# Patient Record
Sex: MALE | Race: WHITE | NOT HISPANIC OR LATINO | Employment: FULL TIME | URBAN - METROPOLITAN AREA
[De-identification: names, ages, dates, MRNs, and addresses within clinical notes are randomized per-mention and may not be internally consistent; named-entity substitution may affect disease eponyms.]

---

## 2018-12-17 ENCOUNTER — OFFICE VISIT (OUTPATIENT)
Dept: FAMILY MEDICINE | Facility: CLINIC | Age: 38
End: 2018-12-17
Payer: COMMERCIAL

## 2018-12-17 VITALS
HEART RATE: 74 BPM | DIASTOLIC BLOOD PRESSURE: 80 MMHG | RESPIRATION RATE: 16 BRPM | SYSTOLIC BLOOD PRESSURE: 119 MMHG | OXYGEN SATURATION: 100 % | BODY MASS INDEX: 21.22 KG/M2 | HEIGHT: 69 IN | WEIGHT: 143.3 LBS | TEMPERATURE: 98.3 F

## 2018-12-17 DIAGNOSIS — Z00.00 ENCOUNTER FOR PREVENTIVE HEALTH EXAMINATION: Primary | ICD-10-CM

## 2018-12-17 DIAGNOSIS — Z23 NEED FOR INFLUENZA VACCINATION: ICD-10-CM

## 2018-12-17 DIAGNOSIS — G43.001 MIGRAINE WITHOUT AURA AND WITH STATUS MIGRAINOSUS, NOT INTRACTABLE: ICD-10-CM

## 2018-12-17 DIAGNOSIS — Z12.5 SCREENING FOR PROSTATE CANCER: ICD-10-CM

## 2018-12-17 DIAGNOSIS — H61.23 BILATERAL IMPACTED CERUMEN: ICD-10-CM

## 2018-12-17 DIAGNOSIS — Z13.220 SCREENING FOR HYPERLIPIDEMIA: ICD-10-CM

## 2018-12-17 RX ORDER — GABAPENTIN 300 MG/1
600 CAPSULE ORAL 2 TIMES DAILY
Qty: 120 CAPSULE | Refills: 2 | Status: SHIPPED | OUTPATIENT
Start: 2018-12-17 | End: 2019-05-14

## 2018-12-17 RX ORDER — GABAPENTIN 300 MG/1
600 CAPSULE ORAL 3 TIMES DAILY
COMMUNITY
End: 2018-12-17

## 2018-12-17 SDOH — HEALTH STABILITY: MENTAL HEALTH: HOW MANY STANDARD DRINKS CONTAINING ALCOHOL DO YOU HAVE ON A TYPICAL DAY?: 1 OR 2

## 2018-12-17 SDOH — HEALTH STABILITY: MENTAL HEALTH: HOW OFTEN DO YOU HAVE 6 OR MORE DRINKS ON ONE OCCASION?: NEVER

## 2018-12-17 SDOH — HEALTH STABILITY: MENTAL HEALTH: HOW OFTEN DO YOU HAVE A DRINK CONTAINING ALCOHOL?: MONTHLY OR LESS

## 2018-12-17 ASSESSMENT — PAIN SCALES - GENERAL: PAINLEVEL: NO PAIN (0)

## 2018-12-17 ASSESSMENT — MIFFLIN-ST. JEOR: SCORE: 1550.38

## 2018-12-17 NOTE — PROGRESS NOTES
SUBJECTIVE:   Nato Vaz is an 38 year old year old man who presents for preventive health visit.     Healthy Habits:    Amount of exercise or daily activities, outside of work: Yoga twice weekly for 1 hour each    Problems taking medications regularly No    Medication side effects: No    Have you had an eye exam in the past two years? no    Do you see a dentist twice per year? yes    Do you have sleep apnea, excessive snoring or daytime drowsiness?no    Water: adequate    Caffeine: drinks 2 cups/coffee daily, 1-2 cups of tea    Sleep: estimates about 7 hours, does wake a few times each night    Migraines with aura: Nato reports having a history of migraines which started in the 3rd grade. Currently controlled with gabapentin, taking 300mg q AM and 600mg in the PM. For breakthrough headaches can use ibuprofen 600mg as needed. Migraines have an aura of central blind spot and losses the ability to read for roughly 20 minutes. Will present with sudden word loss while reading and after having to sound out which will trigger his recognition of impending migraine. Migraines now occur rarely with current medication regimen which he has been on for the past 3 years.     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     Abuse: Current or Past(Physical, Sexual or Emotional)- No  Do you feel safe in your environment - Yes    Past Medical History:   Diagnosis Date     History of migraine with aura 1985     Past Surgical History:   Procedure Laterality Date     NO HISTORY OF SURGERY       Social History     Socioeconomic History     Marital status:      Spouse name: Not on file     Number of children: Not on file     Years of education: Not on file     Highest education level: Not on file   Social Needs     Financial resource strain: Not on file     Food insecurity - worry: Not on file     Food insecurity - inability: Not on file     Transportation needs -  medical: Not on file     Transportation needs - non-medical: Not on file   Occupational History     Occupation: Neurologist   Tobacco Use     Smoking status: Never Smoker     Smokeless tobacco: Never Used   Substance and Sexual Activity     Alcohol use: Yes     Alcohol/week: 0.6 oz     Types: 1 Cans of beer per week     Frequency: Monthly or less     Drinks per session: 1 or 2     Binge frequency: Never     Drug use: No     Sexual activity: Yes     Partners: Female   Other Topics Concern     Not on file   Social History Narrative     Not on file     Family History   Problem Relation Age of Onset     Prostate Cancer Father         diagnosed later but fairly agressive     Abdominal Aortic Aneurysm Maternal Grandfather      Abdominal Aortic Aneurysm Other         Great uncles     Abdominal Aortic Aneurysm Maternal Uncle      Prostate Cancer Paternal Uncle         diagnosed later but fairly agressive     Prostate Cancer Paternal Great-Grandmother         diagnosed later but fairly agressive       If you drink alcohol do you typically have >3 drinks per day or >10 drinks per week? No                     Reviewed orders with patient.  Reviewed health maintenance and updated orders accordingly -       Current Outpatient Medications   Medication Sig Dispense Refill     gabapentin (NEURONTIN) 300 MG capsule Take 600 mg by mouth 3 times daily          Not on File     Injectable Influenza Immunization Documentation    1.  Has the patient received the information for the injectable influenza vaccine? YES     2. Is the patient 6 months of age or older? YES     3. Does the patient have any of the following contraindications?         Severe allergy to eggs? No     Severe allergic reaction to previous influenza vaccines? No   Severe allergy to latex? No       History of Guillain-Widen syndrome? No     Currently have a temperature greater than 100.4F? No        4.  Severely egg allergic patients should have flu vaccine eligibility  "assessed by an MD, RN, or pharmacist, and those who received flu vaccine should be observed for 15 min by an MD, RN, Pharmacist, Medical Technician, or member of clinic staff.\": NO    5. Latex-allergic patients should be given latex-free influenza vaccine No. Please reference the Vaccine latex table to determine if your clinic s product is latex-containing.       Vaccination given by MARTIN Bruce EMT    ROS:  Constitutional: no fevers, chills, night sweats or unintentional weight change   Eyes: no vision change, diplopia or red eyes   Ears, Nose, Mouth, Throat: no tinnitus or hearing change, no epistaxis or nasal discharge, no oral lesions, throat clear   Cardiovascular: no chest pain, palpitations, or pain with walking, no orthopnea or PND   Respiratory: no dyspnea, cough, shortness of breath or wheezing   GI: no nausea, vomiting, diarrhea or constipation, no abdominal pain   : no change in urine, no dysuria or hematuria, no sexual dysfunction   Musculoskeletal: no joint or muscle pain or swelling   Integumentary: no concerning lesions or moles   Neuro: no loss of strength or sensation, no numbness or tingling, no tremor, no dizziness, no headache, no gait disturbance   Endo: no polyuria or polydipsia, no temperature intolerance   Heme/Lymph: no concerning bumps, no bleeding problems   Allergy: no environmental allergies   Psych: no depression or anxiety, no sleep problems    OBJECTIVE:   /80 (BP Location: Right arm, Patient Position: Sitting, Cuff Size: Adult Regular)   Pulse 74   Temp 98.3  F (36.8  C) (Oral)   Resp 16   Ht 1.753 m (5' 9\")   Wt 65 kg (143 lb 4.8 oz)   SpO2 100%   BMI 21.16 kg/m    EXAM:  GENERAL: healthy, alert and no distress  EYES: Eyes grossly normal to inspection, PERRL and conjunctivae and sclerae normal  HENT: normal cephalic/atraumatic, right ear: occluded with wax, left ear: occluded with wax, nose and mouth without ulcers or lesions, oropharynx clear and oral mucous membranes " "moist. *After successful irrigation bilaterally was able to visualize TMs. WNL and intact.   NECK: no adenopathy, no asymmetry, masses, or scars and thyroid normal to palpation  RESP: lungs clear to auscultation - no rales, rhonchi or wheezes  CV: regular rate and rhythm, normal S1 S2, no S3 or S4, no murmur, click or rub, no peripheral edema and peripheral pulses strong  ABDOMEN: soft, nontender, no hepatosplenomegaly, no masses and bowel sounds normal  MS: no gross musculoskeletal defects noted, no edema  SKIN: no suspicious lesions or rashes  NEURO: Normal strength and tone, mentation intact and speech normal  PSYCH: mentation appears normal, affect normal/bright    ASSESSMENT/PLAN:   1. Migraine without aura and with status migrainosus, not intractable  Refill medication to preferred pharmacy. Nato to follow up in 3 months for ongoing monitoring with medication efficacy and compliance, worsening of symptoms or changes in headaches/migraines.   - gabapentin (NEURONTIN) 300 MG capsule; Take 2 capsules (600 mg) by mouth 2 times daily  Dispense: 120 capsule; Refill: 2    2. Encounter for preventive health examination  Preventive measures today: influenza vaccine, lipids. In stable, monogamous relationship and does not need STI screening. Non-smoker.     3. Screening for hyperlipidemia  Nato non-fasting, lipids to be drawn at future date.   - Lipid panel reflex to direct LDL Fasting; Future    4. Screening for prostate cancer  Discussed risk versus benefits and accuracy of PSA; with family history of aggressive prostate cancer would like to proceed with screening as all males diagnoses were proceeded \"by a spike in their PSA\". Would like to proceed with monitoring PSA for potential changes.   - Prostate spec antigen screen; Future    5. Need for influenza vaccination  - Influenza vaccine  - ADMIN VACCINE, INITIAL    6. Bilateral impacted cerumen  Successful irrigation of bilateral cerumen impactions. Patient " "tolerated well, reported development of nystagmus during procedure and slight nausea afterwards that \"is not unexpected\".       COUNSELING:   Reports that he has never smoked. He has never used smokeless tobacco.    Counseling Resources:  ATP IV Guidelines  Pooled Cohorts Equation Calculator  ICSI Preventive Guidelines  Dietary Guidelines for Americans, 2010  USDA's MyPlate  ASA Prophylaxis  Lung CA Screening    Options for treatment and follow-up care were reviewed with the patient. Nato Vaz   engaged in the decision making process and verbalized understanding of the options discussed and agreed with the final plan.    Aye Dunn, CONNIE CNP on 12/17/2018 at 2:24 PM  [facility]  "

## 2018-12-17 NOTE — NURSING NOTE
Chief Complaint   Patient presents with     Establish Care     Pt is here to establish care with a PCC.         Gil Amaya, EMT on 12/17/2018 at 2:01 PM

## 2018-12-17 NOTE — PATIENT INSTRUCTIONS
- Follow up in 3 months; can be either at [facility] or at NP clinic at [address] [place] [address] in [facility].   - Labs drawn at [facility] location while fasting at your convenience.   - Please sign up for My Chart    [facility] location: [phone]; [address] [place] [id number] *I am at this location every other Monday    [facility] location: [phone] [address] [address] *I am at this location most days of the week

## 2018-12-17 NOTE — PROGRESS NOTES
Patient tolerated ear wash well.  Large amount of impacted cerumen removed from bilateral ear/ears.  Tympanic membrane visible.    Gil Amaya, EMT on 12/17/2018 at 3:17 PM

## 2019-05-14 DIAGNOSIS — G43.001 MIGRAINE WITHOUT AURA AND WITH STATUS MIGRAINOSUS, NOT INTRACTABLE: ICD-10-CM

## 2019-05-14 RX ORDER — GABAPENTIN 300 MG/1
600 CAPSULE ORAL 2 TIMES DAILY
Qty: 120 CAPSULE | Refills: 2 | Status: SHIPPED | OUTPATIENT
Start: 2019-05-14 | End: 2019-08-18

## 2019-08-27 ENCOUNTER — TELEPHONE (OUTPATIENT)
Dept: FAMILY MEDICINE | Facility: CLINIC | Age: 39
End: 2019-08-27

## 2019-08-27 NOTE — TELEPHONE ENCOUNTER
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 No answer, left message asking patient to follow up so could apologize directly.   Aye Dunn, APRN CNP  August 27, 2019

## 2019-11-17 DIAGNOSIS — G43.001 MIGRAINE WITHOUT AURA AND WITH STATUS MIGRAINOSUS, NOT INTRACTABLE: ICD-10-CM

## 2019-11-19 RX ORDER — GABAPENTIN 300 MG/1
CAPSULE ORAL
Qty: 120 CAPSULE | Refills: 11 | Status: SHIPPED | OUTPATIENT
Start: 2019-11-19 | End: 2020-11-05

## 2019-11-19 NOTE — TELEPHONE ENCOUNTER
GABAPENTIN 300 MG CAPSULE   Take 2 capsules (600 mg) by mouth 2 times daily   Last Written Prescription Date:  8/27/19  Last Fill Quantity: 120,   # refills: 2  Last Office Visit : 12/17/2018  Future Office visit:  none    Routing refill request to provider for review/approval because:  Drug not on  [facility] or [facility] refill protocol or controlled substance.

## 2020-03-11 ENCOUNTER — HEALTH MAINTENANCE LETTER (OUTPATIENT)
Age: 40
End: 2020-03-11

## 2020-10-31 ENCOUNTER — VIRTUAL VISIT (OUTPATIENT)
Dept: FAMILY MEDICINE | Facility: OTHER | Age: 40
End: 2020-10-31

## 2020-11-04 ENCOUNTER — NURSE TRIAGE (OUTPATIENT)
Dept: NURSING | Facility: CLINIC | Age: 40
End: 2020-11-04

## 2020-11-04 NOTE — TELEPHONE ENCOUNTER
[facility]: Nurse Triage Note  SITUATION/BACKGROUND                                                      Nato Vaz is a 40 year old male who calls with rash.    Allergies: No Known Allergies    ASSESSMENT      40 year old male calls with a rash  He states the rash started a week ago on his face and groin. He states it has spread to his extremities   It is very itchy   He is taking benadryl   He denies breathing problems,chest tightness,swelling throat or tongue or changes in mental status.  He states the rash has improved on his face.      He is scheduled for an appointment tomorrow morning.  He stattes he is aware when to go to the ED.  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  He will continue to take benadryl...      RECOMMENDATION/PLAN                                                      RECOMMENDED DISPOSITION:  appt tomorrow am..  Will comply with recommendation: Yes    If further questions/concerns or if symptoms do not improve, worsen or new symptoms develop, call your PCP or [phone] to talk with the Resident on call, as soon as possible.    Guideline used: rash adult.  Telephone Triage Protocols for Nurses, Fifth Edition, Inessa Bautista, RN, RN

## 2020-11-05 ENCOUNTER — OFFICE VISIT (OUTPATIENT)
Dept: LAB | Facility: CLINIC | Age: 40
End: 2020-11-05
Attending: NURSE PRACTITIONER
Payer: COMMERCIAL

## 2020-11-05 ENCOUNTER — VIRTUAL VISIT (OUTPATIENT)
Dept: FAMILY MEDICINE | Facility: CLINIC | Age: 40
End: 2020-11-05
Payer: COMMERCIAL

## 2020-11-05 DIAGNOSIS — G43.001 MIGRAINE WITHOUT AURA AND WITH STATUS MIGRAINOSUS, NOT INTRACTABLE: ICD-10-CM

## 2020-11-05 DIAGNOSIS — R21 RASH: ICD-10-CM

## 2020-11-05 DIAGNOSIS — R21 RASH: Primary | ICD-10-CM

## 2020-11-05 PROCEDURE — 99214 OFFICE O/P EST MOD 30 MIN: CPT | Mod: 95 | Performed by: NURSE PRACTITIONER

## 2020-11-05 PROCEDURE — U0003 INFECTIOUS AGENT DETECTION BY NUCLEIC ACID (DNA OR RNA); SEVERE ACUTE RESPIRATORY SYNDROME CORONAVIRUS 2 (SARS-COV-2) (CORONAVIRUS DISEASE [COVID-19]), AMPLIFIED PROBE TECHNIQUE, MAKING USE OF HIGH THROUGHPUT TECHNOLOGIES AS DESCRIBED BY CMS-2020-01-R: HCPCS | Mod: 90 | Performed by: PATHOLOGY

## 2020-11-05 PROCEDURE — 99000 SPECIMEN HANDLING OFFICE-LAB: CPT | Performed by: PATHOLOGY

## 2020-11-05 RX ORDER — METHYLPREDNISOLONE 4 MG
TABLET, DOSE PACK ORAL
Qty: 21 TABLET | Refills: 0 | Status: SHIPPED | OUTPATIENT
Start: 2020-11-05 | End: 2021-05-06

## 2020-11-05 RX ORDER — GABAPENTIN 300 MG/1
600 CAPSULE ORAL 2 TIMES DAILY
Qty: 360 CAPSULE | Refills: 3 | Status: SHIPPED | OUTPATIENT
Start: 2020-11-05

## 2020-11-05 NOTE — PROGRESS NOTES
"Nato Vaz is a 40 year old male who is being evaluated via a billable video visit.      The patient has been notified of following:     \"This video visit will be conducted via a call between you and your physician/provider. We have found that certain health care needs can be provided without the need for an in-person physical exam.  This service lets us provide the care you need with a video conversation.  If a prescription is necessary we can send it directly to your pharmacy.  If lab work is needed we can place an order for that and you can then stop by our lab to have the test done at a later time.    Video visits are billed at different rates depending on your insurance coverage.  Please reach out to your insurance provider with any questions.    If during the course of the call the physician/provider feels a video visit is not appropriate, you will not be charged for this service.\"    Patient has given verbal consent for Video visit? Yes  How would you like to obtain your AVS? MyChart  If you are dropped from the video visit, the video invite should be resent to: Other e-mail: [email or web address]  Will anyone else be joining your video visit? No    Subjective     Nato Vaz is a 40 year old male who presents today via video visit for the following health issues:    HPI     Rash: started about one week to 10 days ago. The rash started on his hands, on the webs of his fingers, moved up his body and now encompasses his trunk (abdomen), arms, face. 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 He does not recall any encounters with any Covid patients. He is under a significant amount of stress. He denies changing of laundry soaps, clothing, diet or medications. He does recall this happening once prior in medical school, however he does not recall that the rash was as significant as it currently is. Nato was prescribed a Medrol dose pack to calm the rash down at that time and did not ever " end up taking it. He has been taking Benadryl and Tylenol for this rash which has been somewhat effective. He presents for discussion and evaluation.   Migraine: Nato takes Gabapentin up to twice daily for migraine prevention. He feels that this therapy is quite effective for migraine prevention/managment. He would like a refill of his Gabapentin.     Video Start Time:1040      Review of Systems   Constitutional, HEENT, cardiovascular, pulmonary, gi and gu systems are negative, except as otherwise noted.   *Rash  Objective       Vitals:  No vitals were obtained today due to virtual visit.    Physical Exam     GENERAL: Healthy, alert and no distress  EYES: Eyes grossly normal to inspection.  No discharge or erythema, or obvious scleral/conjunctival abnormalities.  RESP: No audible wheeze, cough, or visible cyanosis.  No visible retractions or increased work of breathing.    SKIN: Rash noted on hands, arms and abdomen, under eyes. Rash appears red, macular with dense areas being in areas identified.   NEURO: Cranial nerves grossly intact.  Mentation and speech appropriate for age.  PSYCH: Mentation appears normal, affect normal/bright, judgement and insight intact, normal speech and appearance well-groomed.    No diagnostics ordered today.     Assessment & Plan     Rash    - methylPREDNISolone (MEDROL DOSEPAK) 4 MG tablet therapy pack  Dispense: 21 tablet; Refill: 0  - Asymptomatic COVID-19 Virus (Coronavirus) by PCR    Migraine without aura and with status migrainosus, not intractable    - gabapentin (NEURONTIN) 300 MG capsule  Dispense: 360 capsule; Refill: 3    Return if symptoms worsen or fail to improve.    Chasity Wild NP  [facility] [place]    Video-Visit Details    Type of service:  Video Visit    Video End Time:1059    Originating Location (pt. Location): Home    Distant Location (provider location): Provider's home     Platform used for Video Visit: AmWell    First,  Nato will take a Medrol dose be as needed for this unrelenting rash. Next, he will continue to observe this rash and update the clinic with any worsening or persisting symptoms. His Gabapentin is filled today. Finally, he will return as needed. He verbalizes understanding of the plan.   CONNIE Vaughn, CNP

## 2020-11-05 NOTE — NURSING NOTE
Chief Complaint   Patient presents with     Derm Problem     Discuss rash on several parts of body.     SIMONE Rice 9:43 AM  11/5/2020

## 2020-11-06 LAB
LABORATORY COMMENT REPORT: NORMAL
SARS-COV-2 RNA SPEC QL NAA+PROBE: NEGATIVE
SARS-COV-2 RNA SPEC QL NAA+PROBE: NORMAL
SPECIMEN SOURCE: NORMAL
SPECIMEN SOURCE: NORMAL

## 2020-11-06 NOTE — PATIENT INSTRUCTIONS

## 2020-12-16 NOTE — TELEPHONE ENCOUNTER
REFERRAL INFORMATION:    Referring Provider:  Chasity Wild NP     Ref[facility] Clinic[facility]     Reason for Visit/Diagnosis: Possible achalasia- mild pain with eating      FUTURE VISIT INFORMATION:    Appointment Date: 1/7/2021    Appointment Time: 2 PM      NOTES STATUS DETAILS   OFFICE NOTE from Referring Provider Internal 11/5/2020 Office visit with Chasity Wild NP    OFFICE NOTE from Other Specialist In process    HOSPITAL DISCHARGE SUMMARY/  ED VISITS N/A    OPERATIVE REPORT N/A    MEDICATION LIST Internal         ENDOSCOPY  In process    COLONOSCOPY In process    ERCP N/A    EUS N/A    STOOL TESTING N/A    PERTINENT LABS In process    PATHOLOGY REPORTS (RELATED) In process    IMAGING (CT, MRI, EGD, MRCP, Small Bowel Follow Through/SBT, MR/CT Enterography) In process      1/29/2021 3:55pm Call and spoke with Pt about recs with [facility]. NANCY was emailed to Pt at email address: [email or web address] - [email or web address]      2/8/2021 5:02pm Called and LVM for Pt. -Woody    2/10/2021 2:51pm Called and LVM for Pt. CSS will message provider about missing recs from [facility]. -Woody

## 2021-01-03 ENCOUNTER — HEALTH MAINTENANCE LETTER (OUTPATIENT)
Age: 41
End: 2021-01-03

## 2021-01-05 ENCOUNTER — MYC MEDICAL ADVICE (OUTPATIENT)
Dept: FAMILY MEDICINE | Facility: CLINIC | Age: 41
End: 2021-01-05

## 2021-01-05 DIAGNOSIS — K21.00 GASTROESOPHAGEAL REFLUX DISEASE WITH ESOPHAGITIS WITHOUT HEMORRHAGE: Primary | ICD-10-CM

## 2021-01-07 ENCOUNTER — PRE VISIT (OUTPATIENT)
Dept: GASTROENTEROLOGY | Facility: CLINIC | Age: 41
End: 2021-01-07

## 2021-01-12 ENCOUNTER — TELEPHONE (OUTPATIENT)
Dept: GASTROENTEROLOGY | Facility: CLINIC | Age: 41
End: 2021-01-12

## 2021-01-12 NOTE — TELEPHONE ENCOUNTER
Spoke with Nato in regards to scheduling his EGD. Patient stated that he would like to hold off on scheduling until after his consult with the GI provider. Patient will call back to schedule after his consult.     Procedure: Upper Endoscopy    Upper Endoscopy Type: EGD    Upper Endoscopy Sedation: Conscious/Moderate    Upper Endoscopy Reason for Procedure: GERD    Preferred Location: [facility]    Scheduling Instructions: If you have not heard from the scheduling office within 2 business days, please call [phone].      Dx: Gastroesophageal reflux disease with esophagitis without hemorrhage [K21.00]

## 2021-02-11 ENCOUNTER — VIRTUAL VISIT (OUTPATIENT)
Dept: GASTROENTEROLOGY | Facility: CLINIC | Age: 41
End: 2021-02-11
Payer: COMMERCIAL

## 2021-02-11 VITALS — WEIGHT: 140 LBS | HEIGHT: 69 IN | BODY MASS INDEX: 20.73 KG/M2

## 2021-02-11 DIAGNOSIS — K21.9 GASTROESOPHAGEAL REFLUX DISEASE WITHOUT ESOPHAGITIS: Primary | ICD-10-CM

## 2021-02-11 DIAGNOSIS — R13.10 DYSPHAGIA, UNSPECIFIED TYPE: ICD-10-CM

## 2021-02-11 DIAGNOSIS — R07.89 ATYPICAL CHEST PAIN: ICD-10-CM

## 2021-02-11 PROCEDURE — 99205 OFFICE O/P NEW HI 60 MIN: CPT | Mod: 95 | Performed by: INTERNAL MEDICINE

## 2021-02-11 ASSESSMENT — MIFFLIN-ST. JEOR: SCORE: 1525.42

## 2021-02-11 NOTE — NURSING NOTE
"Chief Complaint   Patient presents with     New Patient       Vitals:    02/11/21 1435   Weight: 63.5 kg (140 lb)   Height: 1.753 m (5' 9\")       Body mass index is 20.67 kg/m .      WOUND EVALUATION:                        "

## 2021-02-11 NOTE — PROGRESS NOTES
"GI CLINIC VISIT    CC/REFERRING MD:  Chasity Wild  REASON FOR CONSULTATION:   Chasity Wild for: \"Possible Achalasia or Dysphagia- mild pain with eating. External Records [facility] upper endoscopy 2011.\"    ASSESSMENT/PLAN:  1. Dysphagia  Reports 6-month history of near-constant low-grade substernal aching worsened by intermittnet sensation of solid food getting stuck. While described as distinct from historic GERD symptoms, this could reasonably represent reflux esophagitis. Given his allergies and age demographic, eosinophilic esophagitis is also possible. Visualization with EGD would help identify any evidence of esophagitis or associated complication such as stricture. Unlikely to represent achalasia, as he has no problems with liquids. Also of additional concern is his unintentional weight loss, and cancer etiology cannot be ruled out.  Plan:  - schedule for EGD with biopsy of mid and distal esophagus, stomach and small intestine  - consider PPI +/- H2 blocker pending above results    2. Chronic loose stools, with intermittent fecal urgency  Reported baseline stool pattern is 5-9 loose stools throughout the day, associated with fecal urgency but never incontinence. He is overall not concerned about this. Chronic diarrhea has a broad differential, but could consider general workup for IBD vs microscopic colitis vs SIBO vs functional etiologies. We discussed option of simultaneous EGD with colonoscopy, but patient prefers pursuing EGD first prior to further lower workup. EGD biopsies of small intestine could also be helpful for identifying other etiology, such as Celiac disease.    RTC in 3 months, following completion of endoscopy.    Thank you for this consultation.  It was a pleasure to participate in the care of this patient; please contact us with any further questions.  A total of 54 minutes, face to face, was spent with this patient, >50% of which was counseling regarding the above delineated " "issues.    Patient was seen and discussed with Dr. Leach.    Andrade Guy MD  Internal Medicine, PGY2  [facility]    ATTENDING ATTESTATION:    Virtual visit for patient conducted via three way video conference with myself, patient and Dr. Guy.   I reviewed the history and abbreviated physical exam and discussed the management with Dr. Guy on 2/11/21. I reviewed the note and there are no changes to the past medical, family or social history.  A complete 10 point review of systems was obtained. Please see the HPI for pertinent positives and negatives. All other systems were reviewed and were found to be negative. I agree with the documented findings and plan of care as outlined.    40 YO M with history of GERD now with symptoms of dysphagia. Need to evaluate with EGD. Further recommendations to follow EGD. GERD lifestyle modifications recommended as well.    Luis Miguel Leach MD  [facility]  Division of Gastroenterology, Hepatology and Nutrition      [facility]  Patient is a 39yo male with PMH including migraine headaches who is evaluated today via video virtual visit for complaint of dysphagia.     He reports a 6-month history of mild aching pain that he generally localizes to the right side of his sternum, with intermittent radiation to his right armpit and back. This is different from his historical and longstanding GERD symptoms. This pain is not always associated with eating, but rather is an everyday low-grade discomfort that is near-constantly present. Some positional exacerbation when laying down on his back or on his side.     This upper abdominal-substernal pain is intermittently exacerbated by swallowing scratchy or harder food such as pretzels. He reports feeling \"small particles\" of solid food getting stuck. When this happens, it quickly self-resolves on the order of seconds, sometimes before he can reach for water to drink/wash down. Never any associated nausea, " vomiting, halotosis. He denies any similar sensation with liquids. Overall, he has been trying softer foods, but does not identify any other alleviating factors.    His history of GERD is more associated with a burning sensation, which was worked up at [place] in 2011. He reports that EGD was normal at that time, but we do not yet have these records to review. When symptoms were first bothersome, he trialed esomeprazole for 3 weeks without much relief, but the dose is unknown. He is now re-trialing this for the past 7 days with mild relief. He has tried famotidine and OTC antacids in the past that were not helpful. He has never noticed eating more slowly than others.    He otherwise reports that he has been losing some weight over the past 6 months, although cannot specify what amount in pounds as he does not have a home scale. He has gone down a notch on his belt. He attributes this possibly due to loss of muscle mass as he is not exercising as much, especially in the era of COVID. Overall, he has ongoing anorexia, and doesn't feel up to eating as much as he has in the past. He has been lacking energy. Reports mood is okay and stable.    Reports that his normal stooling pattern is at least 5 BM per day, but can get up to 9 with some regularity. His stools are loose. Endorses intermittent fecal urgency, although never incontinence. No bloating or flatulance. Reports hemorrhoids, and has blood intermittently when wiping. However, this pattern is his long-standing baseline and he is overall not too concerned about this so we did not further discuss further details of stool pattern or diet.    Allergies to tree pollens. No known prior history of eczema, with exception of recent rash that he says is now more concentrated around his navel and on his shins. He was prescribed PO steroids for this but never took them, as the rash appeared to be self-resolving. He reports history of sensitive skin, especially to dry air. No  known history of asthma, but does endorse recent 6-week history of mild mostly nonproductive cough. He endorses chronic post-nasal symptoms, and baseline runny/stuffy nose with intermittent epistaxis. Denies tobacco use, illicit drug use. Very rare EtOH.      ROS:    No fevers or chills  Reports unspecified weight loss, as above  No blurry vision, double vision or change in vision  No sore throat  No lymphadenopathy  No headache, paraesthesias, or weakness in a limb  No shortness of breath or wheezing  No chest pain or pressure  No arthralgias or myalgias  Reports rash around navel, as above  Reports dysphagia, as above. No odynophagia.  Reports hemorrhoids and intermittent blood when wiping. No hematochezia, melena  No dysuria, frequency or urgency  No hot/cold intolerance or polyria  No anxiety or depression    PREVIOUS ENDOSCOPY:  Reportedly normal at [place] 2011, although records not available for review at this time.    PERTINENT RELEVANT IMAGING OR LABS:  Reviewed available. None.    ALLERGIES:   No Known Allergies    PERTINENT MEDICATIONS:    Current Outpatient Medications:      gabapentin (NEURONTIN) 300 MG capsule, Take 2 capsules (600 mg) by mouth 2 times daily, Disp: 360 capsule, Rfl: 3     methylPREDNISolone (MEDROL DOSEPAK) 4 MG tablet therapy pack, Follow Package Directions, Disp: 21 tablet, Rfl: 0    PROBLEM LIST  Patient Active Problem List    Diagnosis Date Noted     Migraine without aura and with status migrainosus, not intractable 12/17/2018     Priority: Medium       PERTINENT PAST MEDICAL HISTORY:  Past Medical History:   Diagnosis Date     History of migraine with aura 1985       PREVIOUS SURGERIES:  Past Surgical History:   Procedure Laterality Date     NO HISTORY OF SURGERY         SOCIAL HISTORY:  Social History     Socioeconomic History     Marital status:      Spouse name: Not on file     Number of children: Not on file     Years of education: Not on file     Highest education level:  Not on file   Occupational History     Occupation: Neurologist   Social Needs     Financial resource strain: Not on file     Food insecurity     Worry: Not on file     Inability: Not on file     Transportation needs     Medical: Not on file     Non-medical: Not on file   Tobacco Use     Smoking status: Never Smoker     Smokeless tobacco: Never Used   Substance and Sexual Activity     Alcohol use: Yes     Alcohol/week: 1.0 standard drinks     Types: 1 Cans of beer per week     Frequency: Monthly or less     Drinks per session: 1 or 2     Binge frequency: Never     Drug use: No     Sexual activity: Yes     Partners: Female   Lifestyle     Physical activity     Days per week: Not on file     Minutes per session: Not on file     Stress: Not on file   Relationships     Social connections     Talks on phone: Not on file     Gets together: Not on file     Attends [employer] service: Not on file     Active member of club or organization: Not on file     Attends meetings of clubs or organizations: Not on file     Relationship status: Not on file     Intimate partner violence     Fear of current or ex partner: Not on file     Emotionally abused: Not on file     Physically abused: Not on file     Forced sexual activity: Not on file   Other Topics Concern     Not on file   Social History Narrative     Not on file       FAMILY HISTORY:  Family History   Problem Relation Age of Onset     Prostate Cancer Father         diagnosed later but fairly agressive     Abdominal Aortic Aneurysm Maternal Grandfather      Abdominal Aortic Aneurysm Other         Great uncles     Abdominal Aortic Aneurysm Maternal Uncle      Prostate Cancer Paternal Uncle         diagnosed later but fairly agressive     Prostate Cancer Paternal Great-Grandmother         diagnosed later but fairly agressive       Past/family/social history reviewed and no changes    PHYSICAL EXAMINATION:  Constitutional: aaox3, cooperative, pleasant, not dyspneic/diaphoretic, no  acute distress  Vitals reviewed: There were no vitals taken for this visit.  Wt:   Wt Readings from Last 2 Encounters:   12/17/18 65 kg (143 lb 4.8 oz)      Eyes: Sclera anicteric/injected  Respiratory: Unlabored breathing  Skin: no jaundice  Psych: Normal affect

## 2021-02-11 NOTE — LETTER
"  2/11/2021      RE: Nato Vaz  [address]  [address]  [place] [address]      Dear Colleague,    Thank you for referring your patient, Nato Vaz, to the [facility] [place]. Please see a copy of my visit note below.    GI CLINIC VISIT    CC/REFERRING MD:  Chasity Wild  REASON FOR CONSULTATION:   Chasity Wild for: \"Possible Achalasia or Dysphagia- mild pain with eating. External Records [facility] upper endoscopy 2011.\"    ASSESSMENT/PLAN:  1. Dysphagia  Reports 6-month history of near-constant low-grade substernal aching worsened by intermittnet sensation of solid food getting stuck. While described as distinct from historic GERD symptoms, this could reasonably represent reflux esophagitis. Given his allergies and age demographic, eosinophilic esophagitis is also possible. Visualization with EGD would help identify any evidence of esophagitis or associated complication such as stricture. Unlikely to represent achalasia, as he has no problems with liquids. Also of additional concern is his unintentional weight loss, and cancer etiology cannot be ruled out.  Plan:  - schedule for EGD with biopsy of mid and distal esophagus, stomach and small intestine  - consider PPI +/- H2 blocker pending above results    2. Chronic loose stools, with intermittent fecal urgency  Reported baseline stool pattern is 5-9 loose stools throughout the day, associated with fecal urgency but never incontinence. He is overall not concerned about this. Chronic diarrhea has a broad differential, but could consider general workup for IBD vs microscopic colitis vs SIBO vs functional etiologies. We discussed option of simultaneous EGD with colonoscopy, but patient prefers pursuing EGD first prior to further lower workup. EGD biopsies of small intestine could also be helpful for identifying other etiology, such as Celiac disease.    RTC in 3 months, following completion of " endoscopy.    Thank you for this consultation.  It was a pleasure to participate in the care of this patient; please contact us with any further questions.  A total of 54 minutes, face to face, was spent with this patient, >50% of which was counseling regarding the above delineated issues.    Patient was seen and discussed with Dr. Leach.    Andrade Guy MD  Internal Medicine, PGY2  [facility]    ATTENDING ATTESTATION:    Virtual visit for patient conducted via three way video conference with myself, patient and Dr. Guy.   I reviewed the history and abbreviated physical exam and discussed the management with Dr. Guy on 2/11/21. I reviewed the note and there are no changes to the past medical, family or social history.  A complete 10 point review of systems was obtained. Please see the HPI for pertinent positives and negatives. All other systems were reviewed and were found to be negative. I agree with the documented findings and plan of care as outlined.    42 YO M with history of GERD now with symptoms of dysphagia. Need to evaluate with EGD. Further recommendations to follow EGD. GERD lifestyle modifications recommended as well.    Luis Miguel Leach MD  [facility]  Division of Gastroenterology, Hepatology and Nutrition      [facility]  Patient is a 39yo male with PMH including migraine headaches who is evaluated today via video virtual visit for complaint of dysphagia.     He reports a 6-month history of mild aching pain that he generally localizes to the right side of his sternum, with intermittent radiation to his right armpit and back. This is different from his historical and longstanding GERD symptoms. This pain is not always associated with eating, but rather is an everyday low-grade discomfort that is near-constantly present. Some positional exacerbation when laying down on his back or on his side.     This upper abdominal-substernal pain is intermittently exacerbated by  "swallowing scratchy or harder food such as pretzels. He reports feeling \"small particles\" of solid food getting stuck. When this happens, it quickly self-resolves on the order of seconds, sometimes before he can reach for water to drink/wash down. Never any associated nausea, vomiting, halotosis. He denies any similar sensation with liquids. Overall, he has been trying softer foods, but does not identify any other alleviating factors.    His history of GERD is more associated with a burning sensation, which was worked up at [place] in 2011. He reports that EGD was normal at that time, but we do not yet have these records to review. When symptoms were first bothersome, he trialed esomeprazole for 3 weeks without much relief, but the dose is unknown. He is now re-trialing this for the past 7 days with mild relief. He has tried famotidine and OTC antacids in the past that were not helpful. He has never noticed eating more slowly than others.    He otherwise reports that he has been losing some weight over the past 6 months, although cannot specify what amount in pounds as he does not have a home scale. He has gone down a notch on his belt. He attributes this possibly due to loss of muscle mass as he is not exercising as much, especially in the era of COVID. Overall, he has ongoing anorexia, and doesn't feel up to eating as much as he has in the past. He has been lacking energy. Reports mood is okay and stable.    Reports that his normal stooling pattern is at least 5 BM per day, but can get up to 9 with some regularity. His stools are loose. Endorses intermittent fecal urgency, although never incontinence. No bloating or flatulance. Reports hemorrhoids, and has blood intermittently when wiping. However, this pattern is his long-standing baseline and he is overall not too concerned about this so we did not further discuss further details of stool pattern or diet.    Allergies to tree pollens. No known prior history of " eczema, with exception of recent rash that he says is now more concentrated around his navel and on his shins. He was prescribed PO steroids for this but never took them, as the rash appeared to be self-resolving. He reports history of sensitive skin, especially to dry air. No known history of asthma, but does endorse recent 6-week history of mild mostly nonproductive cough. He endorses chronic post-nasal symptoms, and baseline runny/stuffy nose with intermittent epistaxis. Denies tobacco use, illicit drug use. Very rare EtOH.      ROS:    No fevers or chills  Reports unspecified weight loss, as above  No blurry vision, double vision or change in vision  No sore throat  No lymphadenopathy  No headache, paraesthesias, or weakness in a limb  No shortness of breath or wheezing  No chest pain or pressure  No arthralgias or myalgias  Reports rash around navel, as above  Reports dysphagia, as above. No odynophagia.  Reports hemorrhoids and intermittent blood when wiping. No hematochezia, melena  No dysuria, frequency or urgency  No hot/cold intolerance or polyria  No anxiety or depression    PREVIOUS ENDOSCOPY:  Reportedly normal at [place] 2011, although records not available for review at this time.    PERTINENT RELEVANT IMAGING OR LABS:  Reviewed available. None.    ALLERGIES:   No Known Allergies    PERTINENT MEDICATIONS:    Current Outpatient Medications:      gabapentin (NEURONTIN) 300 MG capsule, Take 2 capsules (600 mg) by mouth 2 times daily, Disp: 360 capsule, Rfl: 3     methylPREDNISolone (MEDROL DOSEPAK) 4 MG tablet therapy pack, Follow Package Directions, Disp: 21 tablet, Rfl: 0    PROBLEM LIST  Patient Active Problem List    Diagnosis Date Noted     Migraine without aura and with status migrainosus, not intractable 12/17/2018     Priority: Medium       PERTINENT PAST MEDICAL HISTORY:  Past Medical History:   Diagnosis Date     History of migraine with aura 1985       PREVIOUS SURGERIES:  Past Surgical History:    Procedure Laterality Date     NO HISTORY OF SURGERY         SOCIAL HISTORY:  Social History     Socioeconomic History     Marital status:      Spouse name: Not on file     Number of children: Not on file     Years of education: Not on file     Highest education level: Not on file   Occupational History     Occupation: Neurologist   Social Needs     Financial resource strain: Not on file     Food insecurity     Worry: Not on file     Inability: Not on file     Transportation needs     Medical: Not on file     Non-medical: Not on file   Tobacco Use     Smoking status: Never Smoker     Smokeless tobacco: Never Used   Substance and Sexual Activity     Alcohol use: Yes     Alcohol/week: 1.0 standard drinks     Types: 1 Cans of beer per week     Frequency: Monthly or less     Drinks per session: 1 or 2     Binge frequency: Never     Drug use: No     Sexual activity: Yes     Partners: Female   Lifestyle     Physical activity     Days per week: Not on file     Minutes per session: Not on file     Stress: Not on file   Relationships     Social connections     Talks on phone: Not on file     Gets together: Not on file     Attends Baptism service: Not on file     Active member of club or organization: Not on file     Attends meetings of clubs or organizations: Not on file     Relationship status: Not on file     Intimate partner violence     Fear of current or ex partner: Not on file     Emotionally abused: Not on file     Physically abused: Not on file     Forced sexual activity: Not on file   Other Topics Concern     Not on file   Social History Narrative     Not on file       FAMILY HISTORY:  Family History   Problem Relation Age of Onset     Prostate Cancer Father         diagnosed later but fairly agressive     Abdominal Aortic Aneurysm Maternal Grandfather      Abdominal Aortic Aneurysm Other         Great uncles     Abdominal Aortic Aneurysm Maternal Uncle      Prostate Cancer Paternal Uncle         diagnosed  "later but fairly agressive     Prostate Cancer Paternal Great-Grandmother         diagnosed later but fairly agressive       Past/family/social history reviewed and no changes    PHYSICAL EXAMINATION:  Constitutional: aaox3, cooperative, pleasant, not dyspneic/diaphoretic, no acute distress  Vitals reviewed: There were no vitals taken for this visit.  Wt:   Wt Readings from Last 2 Encounters:   12/17/18 65 kg (143 lb 4.8 oz)      Eyes: Sclera anicteric/injected  Respiratory: Unlabored breathing  Skin: no jaundice  Psych: Normal affect      Nato Vaz is a 40 year old male who is being evaluated via a billable video visit.      The patient has been notified of following:     \"This video visit will be conducted via a call between you and your physician/provider. We have found that certain health care needs can be provided without the need for an in-person physical exam.  This service lets us provide the care you need with a video conversation.  If a prescription is necessary we can send it directly to your pharmacy.  If lab work is needed we can place an order for that and you can then stop by our lab to have the test done at a later time.    If during the course of the call the physician/provider feels a video visit is not appropriate, you will not be charged for this service.\"     Patient confirmed that they are in [place] for today's visit yes.    Video-Visit Details  Type of service:  Video Visit    Video Start Time: 3:02PM  Video End Time:  3:55PM    Originating Location (pt. Location): Home    Distant Location (provider location):  [facility] [place]     Platform used: [employer]    Again, thank you for allowing me to participate in the care of your patient.      Sincerely,    Luis Miguel Leach MD    "

## 2021-02-11 NOTE — PROGRESS NOTES
"Nato Vaz is a 40 year old male who is being evaluated via a billable video visit.      The patient has been notified of following:     \"This video visit will be conducted via a call between you and your physician/provider. We have found that certain health care needs can be provided without the need for an in-person physical exam.  This service lets us provide the care you need with a video conversation.  If a prescription is necessary we can send it directly to your pharmacy.  If lab work is needed we can place an order for that and you can then stop by our lab to have the test done at a later time.    If during the course of the call the physician/provider feels a video visit is not appropriate, you will not be charged for this service.\"     Patient confirmed that they are in [place] for today's visit yes.    Video-Visit Details  Type of service:  Video Visit    Video Start Time: 3:02PM  Video End Time:  3:55PM    Originating Location (pt. Location): Home    Distant Location (provider location):  [facility] [place]     Platform used: [employer]            "

## 2021-02-11 NOTE — PATIENT INSTRUCTIONS
It was very nice to meet you today for your virtual visit.  I have outlined our recommendations below.    My office will contact you about scheduling the upper endoscopy.  We will have the option to do this test without sedation.  However, we can always use the sedation if needed.    Please follow antireflux lifestyle modifications.  This includes not eating within 2 to 3 hours of going to bed.  I also recommend that you elevate the head of your bed 4 to 6 inches.  You can do this by putting books underneath the bedpost of the head of your bed.  You could also purchase a wedge pillow and use this instead.  Please minimize alcohol and caffeine intake.  Please avoid nonsteroidal anti-inflammatory medications.  Tylenol is okay to use.    Follow-up in 3 months to review the results of the upper endoscopy.

## 2021-02-15 ENCOUNTER — TELEPHONE (OUTPATIENT)
Dept: GASTROENTEROLOGY | Facility: CLINIC | Age: 41
End: 2021-02-15

## 2021-02-15 NOTE — TELEPHONE ENCOUNTER
LVM for patient with call center number.  Schedule a video return visit with Dr. Leach in 3 months for a follow-up appointment.

## 2021-02-16 ENCOUNTER — TELEPHONE (OUTPATIENT)
Dept: GASTROENTEROLOGY | Facility: OUTPATIENT CENTER | Age: 41
End: 2021-02-16

## 2021-02-16 NOTE — TELEPHONE ENCOUNTER
Patient is scheduled for EGD with Dr. SINGH    Spoke with: PATIENT    Date of Procedure: 3/23/2021    Location: UN[facility]    Sedation Type CS    Pre-op for [facility] MAC and ORN    (if yes advise patient they will need a pre-op prior to procedure)      Is patient on blood thinners? -N (If yes- inform patient to follow up with PCP or provider for follow up instructions)     Informed patient they will need an adult  Y    Informed Patient of COVID Test Requirement Y-SCHED    Preferred Pharmacy for Pre Prescription N/A    Confirmed Nurse will call to complete assessment N    Additional comments: NA

## 2021-03-06 DIAGNOSIS — Z11.59 ENCOUNTER FOR SCREENING FOR OTHER VIRAL DISEASES: Primary | ICD-10-CM

## 2021-03-20 DIAGNOSIS — Z11.59 ENCOUNTER FOR SCREENING FOR OTHER VIRAL DISEASES: ICD-10-CM

## 2021-03-20 LAB
LABORATORY COMMENT REPORT: NORMAL
SARS-COV-2 RNA RESP QL NAA+PROBE: NEGATIVE
SARS-COV-2 RNA RESP QL NAA+PROBE: NORMAL
SPECIMEN SOURCE: NORMAL
SPECIMEN SOURCE: NORMAL

## 2021-03-20 PROCEDURE — U0003 INFECTIOUS AGENT DETECTION BY NUCLEIC ACID (DNA OR RNA); SEVERE ACUTE RESPIRATORY SYNDROME CORONAVIRUS 2 (SARS-COV-2) (CORONAVIRUS DISEASE [COVID-19]), AMPLIFIED PROBE TECHNIQUE, MAKING USE OF HIGH THROUGHPUT TECHNOLOGIES AS DESCRIBED BY CMS-2020-01-R: HCPCS | Mod: 90 | Performed by: PATHOLOGY

## 2021-03-20 PROCEDURE — U0005 INFEC AGEN DETEC AMPLI PROBE: HCPCS | Mod: 90 | Performed by: PATHOLOGY

## 2021-03-23 ENCOUNTER — HOSPITAL ENCOUNTER (OUTPATIENT)
Facility: CLINIC | Age: 41
Discharge: HOME | End: 2021-03-23
Attending: RADIOLOGY | Admitting: RADIOLOGY
Payer: COMMERCIAL

## 2021-03-23 VITALS
SYSTOLIC BLOOD PRESSURE: 121 MMHG | BODY MASS INDEX: 20.54 KG/M2 | HEART RATE: 55 BPM | DIASTOLIC BLOOD PRESSURE: 83 MMHG | OXYGEN SATURATION: 100 % | RESPIRATION RATE: 18 BRPM | WEIGHT: 139.11 LBS | TEMPERATURE: 97.7 F

## 2021-03-23 DIAGNOSIS — K21.9 GASTROESOPHAGEAL REFLUX DISEASE WITHOUT ESOPHAGITIS: Primary | ICD-10-CM

## 2021-03-23 DIAGNOSIS — G43.001 MIGRAINE WITHOUT AURA AND WITH STATUS MIGRAINOSUS, NOT INTRACTABLE: ICD-10-CM

## 2021-03-23 LAB — UPPER GI ENDOSCOPY: NORMAL

## 2021-03-23 PROCEDURE — 250N000011 HC RX IP 250 OP 636: Performed by: INTERNAL MEDICINE

## 2021-03-23 PROCEDURE — 88341 IMHCHEM/IMCYTCHM EA ADD ANTB: CPT | Mod: TC | Performed by: INTERNAL MEDICINE

## 2021-03-23 PROCEDURE — 88342 IMHCHEM/IMCYTCHM 1ST ANTB: CPT | Mod: 26 | Performed by: PATHOLOGY

## 2021-03-23 PROCEDURE — 250N000009 HC RX 250: Performed by: INTERNAL MEDICINE

## 2021-03-23 PROCEDURE — G0500 MOD SEDAT ENDO SERVICE >5YRS: HCPCS | Performed by: INTERNAL MEDICINE

## 2021-03-23 PROCEDURE — 88305 TISSUE EXAM BY PATHOLOGIST: CPT | Mod: TC | Performed by: INTERNAL MEDICINE

## 2021-03-23 PROCEDURE — 43235 EGD DIAGNOSTIC BRUSH WASH: CPT | Performed by: INTERNAL MEDICINE

## 2021-03-23 PROCEDURE — 88342 IMHCHEM/IMCYTCHM 1ST ANTB: CPT | Mod: TC | Performed by: INTERNAL MEDICINE

## 2021-03-23 PROCEDURE — 43239 EGD BIOPSY SINGLE/MULTIPLE: CPT | Performed by: INTERNAL MEDICINE

## 2021-03-23 PROCEDURE — 88305 TISSUE EXAM BY PATHOLOGIST: CPT | Mod: 26 | Performed by: PATHOLOGY

## 2021-03-23 RX ORDER — PROCHLORPERAZINE MALEATE 10 MG
10 TABLET ORAL EVERY 6 HOURS PRN
Status: DISCONTINUED | OUTPATIENT
Start: 2021-03-23 | End: 2021-03-23 | Stop reason: HOSPADM

## 2021-03-23 RX ORDER — NALOXONE HYDROCHLORIDE 0.4 MG/ML
0.2 INJECTION, SOLUTION INTRAMUSCULAR; INTRAVENOUS; SUBCUTANEOUS
Status: DISCONTINUED | OUTPATIENT
Start: 2021-03-23 | End: 2021-03-23 | Stop reason: HOSPADM

## 2021-03-23 RX ORDER — ONDANSETRON 2 MG/ML
4 INJECTION INTRAMUSCULAR; INTRAVENOUS
Status: DISCONTINUED | OUTPATIENT
Start: 2021-03-23 | End: 2021-03-23 | Stop reason: HOSPADM

## 2021-03-23 RX ORDER — ONDANSETRON 2 MG/ML
4 INJECTION INTRAMUSCULAR; INTRAVENOUS EVERY 6 HOURS PRN
Status: DISCONTINUED | OUTPATIENT
Start: 2021-03-23 | End: 2021-03-23 | Stop reason: HOSPADM

## 2021-03-23 RX ORDER — NALOXONE HYDROCHLORIDE 0.4 MG/ML
0.4 INJECTION, SOLUTION INTRAMUSCULAR; INTRAVENOUS; SUBCUTANEOUS
Status: DISCONTINUED | OUTPATIENT
Start: 2021-03-23 | End: 2021-03-23 | Stop reason: HOSPADM

## 2021-03-23 RX ORDER — FENTANYL CITRATE 50 UG/ML
INJECTION, SOLUTION INTRAMUSCULAR; INTRAVENOUS
Status: COMPLETED | OUTPATIENT
Start: 2021-03-23 | End: 2021-03-23

## 2021-03-23 RX ORDER — LIDOCAINE 40 MG/G
CREAM TOPICAL
Status: DISCONTINUED | OUTPATIENT
Start: 2021-03-23 | End: 2021-03-23 | Stop reason: HOSPADM

## 2021-03-23 RX ORDER — ONDANSETRON 4 MG/1
4 TABLET, ORALLY DISINTEGRATING ORAL EVERY 6 HOURS PRN
Status: DISCONTINUED | OUTPATIENT
Start: 2021-03-23 | End: 2021-03-23 | Stop reason: HOSPADM

## 2021-03-23 RX ORDER — OMEPRAZOLE 40 MG/1
40 CAPSULE, DELAYED RELEASE ORAL
Qty: 60 CAPSULE | Refills: 3 | Status: SHIPPED | OUTPATIENT
Start: 2021-03-23 | End: 2021-05-06

## 2021-03-23 RX ORDER — FLUMAZENIL 0.1 MG/ML
0.2 INJECTION, SOLUTION INTRAVENOUS
Status: DISCONTINUED | OUTPATIENT
Start: 2021-03-23 | End: 2021-03-23 | Stop reason: HOSPADM

## 2021-03-23 RX ADMIN — MIDAZOLAM 1 MG: 1 INJECTION INTRAMUSCULAR; INTRAVENOUS at 10:04

## 2021-03-23 RX ADMIN — TOPICAL ANESTHETIC 1 SPRAY: 200 SPRAY DENTAL; PERIODONTAL at 10:00

## 2021-03-23 RX ADMIN — MIDAZOLAM 2 MG: 1 INJECTION INTRAMUSCULAR; INTRAVENOUS at 10:00

## 2021-03-23 RX ADMIN — FENTANYL CITRATE 100 MCG: 50 INJECTION, SOLUTION INTRAMUSCULAR; INTRAVENOUS at 10:01

## 2021-03-23 RX ADMIN — FENTANYL CITRATE 50 MCG: 50 INJECTION, SOLUTION INTRAMUSCULAR; INTRAVENOUS at 10:07

## 2021-03-23 RX ADMIN — FENTANYL CITRATE 50 MCG: 50 INJECTION, SOLUTION INTRAMUSCULAR; INTRAVENOUS at 10:05

## 2021-03-23 RX ADMIN — MIDAZOLAM 1 MG: 1 INJECTION INTRAMUSCULAR; INTRAVENOUS at 10:07

## 2021-03-23 NOTE — DISCHARGE SUMMARY
Discharge instructions reviewed verbally with wife by FLASH Ponce, patient and wife have no questions or concerns, patient tolerated 2 cans of Ginger Ale.

## 2021-03-25 LAB — COPATH REPORT: NORMAL

## 2021-03-26 ENCOUNTER — CARE COORDINATION (OUTPATIENT)
Dept: GASTROENTEROLOGY | Facility: CLINIC | Age: 41
End: 2021-03-26

## 2021-03-26 DIAGNOSIS — K29.70 HELICOBACTER PYLORI GASTRITIS: Primary | ICD-10-CM

## 2021-03-26 DIAGNOSIS — B96.81 HELICOBACTER PYLORI GASTRITIS: Primary | ICD-10-CM

## 2021-03-26 RX ORDER — DOXYCYCLINE HYCLATE 100 MG
100 TABLET ORAL 2 TIMES DAILY
Qty: 28 TABLET | Refills: 0 | Status: SHIPPED | OUTPATIENT
Start: 2021-03-26 | End: 2021-04-09

## 2021-03-26 RX ORDER — METRONIDAZOLE 250 MG/1
250 TABLET ORAL 4 TIMES DAILY
Qty: 56 TABLET | Refills: 0 | Status: SHIPPED | OUTPATIENT
Start: 2021-03-26 | End: 2021-04-09

## 2021-04-12 ENCOUNTER — TELEPHONE (OUTPATIENT)
Dept: GASTROENTEROLOGY | Facility: CLINIC | Age: 41
End: 2021-04-12

## 2021-04-12 NOTE — TELEPHONE ENCOUNTER
Prior Authorization Retail Medication Request    Medication/Dose: omeprazole  ICD code (if different than what is on RX):  K219, B98544  Previously Tried and Failed:  unknown  Rationale:  unknown    Insurance Name:  [facility]  Insurance ID:  [id number]      Pharmacy Information (if different than what is on RX)  Name:  [facility]  Phone:  [phone]

## 2021-04-15 NOTE — TELEPHONE ENCOUNTER
Central Prior Authorization Team   Phone: [phone]      PA Initiation    Medication: omeprazole (PRILOSEC) 40 MG DR capsule (2/day)  Insurance Company: [employer] - Phone [phone] Fax [phone]  Pharmacy Filling the Rx: [place], [place] - [address]  Filling Pharmacy Phone: [phone]  Filling Pharmacy Fax:    Start Date: 4/15/2021

## 2021-04-16 NOTE — TELEPHONE ENCOUNTER
Prior Authorization Approval    Authorization Effective Date: 3/30/2021  Authorization Expiration Date: 3/30/2022  Medication: omeprazole (PRILOSEC) 40 MG DR capsule (2/day)  Approved Dose/Quantity: 60  Reference #:     Insurance Company: [employer] - Phone [phone] Fax [phone]  Expected CoPay: $0.91  Which Pharmacy is filling the prescription (Not needed for infusion/clinic administered): [facility] [place], [place] - [address]  Pharmacy Notified: Yes - spoke to Raya  Patient Notified: No

## 2021-04-25 ENCOUNTER — HEALTH MAINTENANCE LETTER (OUTPATIENT)
Age: 41
End: 2021-04-25

## 2021-04-29 ENCOUNTER — MYC MEDICAL ADVICE (OUTPATIENT)
Dept: GASTROENTEROLOGY | Facility: CLINIC | Age: 41
End: 2021-04-29

## 2021-04-30 NOTE — TELEPHONE ENCOUNTER
talked to him and sent quad therapy. After done with therapy he will need to hold PPI for 3-4 weeks and then check stool antigen.   Will assess timing of virtual visit.   Message to patient.

## 2021-05-05 ENCOUNTER — PATIENT OUTREACH (OUTPATIENT)
Dept: GASTROENTEROLOGY | Facility: CLINIC | Age: 41
End: 2021-05-05

## 2021-05-05 NOTE — CONFIDENTIAL NOTE
Patient responded to a my chart message about completion of the h pylori stool antigen  Pt states he forgot  Questioning if he should cancel his appt with Dr. Leach on May 6   Pt has appt to  stool specimen collection today  Discussed rescheduled appt   Patient states he is having some new symptoms of intermittent right chest pain, constant bleching and nausea  Also patient is moving out of [place].

## 2021-05-06 ENCOUNTER — VIRTUAL VISIT (OUTPATIENT)
Dept: GASTROENTEROLOGY | Facility: CLINIC | Age: 41
End: 2021-05-06
Payer: COMMERCIAL

## 2021-05-06 VITALS — HEIGHT: 69 IN | WEIGHT: 136.69 LBS | BODY MASS INDEX: 20.24 KG/M2

## 2021-05-06 DIAGNOSIS — R07.89 ATYPICAL CHEST PAIN: ICD-10-CM

## 2021-05-06 DIAGNOSIS — R13.10 DYSPHAGIA, UNSPECIFIED TYPE: ICD-10-CM

## 2021-05-06 DIAGNOSIS — K29.70 HELICOBACTER PYLORI GASTRITIS: Primary | ICD-10-CM

## 2021-05-06 DIAGNOSIS — K30 NON-ULCER DYSPEPSIA: ICD-10-CM

## 2021-05-06 DIAGNOSIS — B96.81 HELICOBACTER PYLORI GASTRITIS: Primary | ICD-10-CM

## 2021-05-06 PROCEDURE — 99213 OFFICE O/P EST LOW 20 MIN: CPT | Mod: 95 | Performed by: INTERNAL MEDICINE

## 2021-05-06 ASSESSMENT — MIFFLIN-ST. JEOR: SCORE: 1505.38

## 2021-05-06 NOTE — PROGRESS NOTES
GI CLINIC VISIT    CC/REFERRING MD:  No ref. provider found  REASON FOR CONSULTATION:   No ref. provider found for   Chief Complaint   Patient presents with     Follow Up       ASSESSMENT/PLAN:  41-year-old gentleman here today to follow-up for atypical chest discomfort (aching), belching, and rare dysphagia.    His upper endoscopy showed a completely normal esophagus.  Biopsies showed no evidence of reflux or eosinophilic esophagitis.  There was no stricturing or any other abnormalities.    The upper endoscopy did show H. pylori gastritis and while he was on quadruple therapy for this his symptoms did greatly improve.  This does lead me to believe that his symptoms were driven by his gastritis.  We need to make sure that the H. pylori has been eradicated.  Therefore, he will provide us a stool sample for H. pylori stool antigen.  If this is still positive then we will need to treat him with another course of therapy.  If this is negative then his symptoms may be driven mainly by nonulcerative dyspepsia.  If that is the case, he will likely derive benefit from taking a longer course of PPI therapy.  After he submits his stool sample he can start taking omeprazole 40 mg p.o. twice daily.  I would recommend a 2-month course.    He is currently planning on moving to [place].  We will therefore follow-up with him remotely.  I will be in touch with him after he submits a stool sample next week.  He will give us an update on how his symptoms change with a more prolonged course of PPI therapy.    Regarding his question of dysphagia: as outlined above I think it is possible that this could actually be gas discomfort that is related to nonulcer dyspepsia.  We will follow the symptoms on PPI.  His esophagus showed no evidence of inflammation, stricture, or other pathology.  Biopsies showed no evidence of reflux or eosinophilic esophagitis.  If his dysphagia persists despite the above interventions and we can consider  pH impedance testing and esophageal manometry testing.    All questions were answered to the patient's stated satisfaction.  He agrees with the plan as outlined.    RTC PRN    Thank you for this consultation.  It was a pleasure to participate in the care of this patient; please contact us with any further questions.      This note was created with voice recognition software, and while reviewed for accuracy, typos may remain.     Luis Miguel Leach MD  Inflammatory Bowel Disease Program  Division of Gastroenterology, Hepatology and Nutrition  [facility]  Pager: [phone]      HPI:    41-year-old gentleman here today to follow-up in clinic for atypical chest discomfort, belching, and rare dysphagia.  I direct you to my initial consult note dated 2/11/2021.    After our visit we elected to proceed with an upper endoscopy.  His esophagus was normal and proximal and distal biopsies were unremarkable.  He did have some erythema in his stomach and biopsies showed H. pylori gastritis.  Duodenal biopsies were negative.    He was treated with bismuth quadruple therapy x14 days.  He took all pills and did not miss any.    He notes that while he was on the twice daily PPI as well as the additional medication he did feel his symptoms were greatly improved.  There was some GI upset related to the antibiotics but overall he was doing better.    Since stopping his medications he feels that his symptoms have returned.  There is chest aching as well as a significant belching.  He does still have some trouble swallowing.  However, now that he has paid more attention to it he thinks that this is gas discomfort in his epigastric region and when he burps this relieves the discomfort.  He no longer feels that he is having true dysphagia.    He is concerned that he could have ongoing H. pylori infection.  He has not yet submitted his follow-up H. pylori stool antigen.  He has not been taking PPI therapy for the last 4 weeks.    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  He will miss being here in [place] but he is excited about his new opportunity.    ROS:    No fevers or chills  No weight loss  No blurry vision, double vision or change in vision  No sore throat  No lymphadenopathy  No headache, paraesthesias, or weakness in a limb  No shortness of breath or wheezing  No chest pain or pressure  No arthralgias or myalgias  No rashes or skin changes  No odynophagia or dysphagia  No BRBPR, hematochezia, melena  No dysuria, frequency or urgency  No hot/cold intolerance or polyria  No anxiety or depression    PREVIOUS ENDOSCOPY:  EGD completed 3/23/2021  -Normal esophagus biopsied.  Proximal and distal biopsies normal.  Z-line regular at 42 cm from the incisors.  -Erythematous mucosa in the stomach.  Biopsies showed H. pylori  Normal duodenum with normal biopsies    PERTINENT RELEVANT IMAGING OR LABS:  Reviewed    ALLERGIES:   No Known Allergies    PERTINENT MEDICATIONS:    Current Outpatient Medications:      gabapentin (NEURONTIN) 300 MG capsule, Take 2 capsules (600 mg) by mouth 2 times daily, Disp: 360 capsule, Rfl: 3     terbinafine (LAMISIL) 250 MG tablet, Take 1 tablet (250 mg) by mouth daily, Disp: 90 tablet, Rfl: 0    PROBLEM LIST  Patient Active Problem List    Diagnosis Date Noted     Migraine without aura and with status migrainosus, not intractable 12/17/2018     Priority: Medium       PERTINENT PAST MEDICAL HISTORY:  Past Medical History:   Diagnosis Date     History of migraine with aura 1985       PREVIOUS SURGERIES:  Past Surgical History:   Procedure Laterality Date     ESOPHAGOSCOPY, GASTROSCOPY, DUODENOSCOPY (EGD), COMBINED N/A 3/23/2021    Procedure: ESOPHAGOGASTRODUODENOSCOPY (EGD);  Surgeon: Luis Miguel Leach MD;  Location:  GI     NO HISTORY OF SURGERY         SOCIAL HISTORY:  Social History     Socioeconomic History     Marital status:       Spouse name: Not on file     Number of children: Not on file     Years of education: Not on file     Highest education level: Not on file   Occupational History     Occupation: Neurologist   Social Needs     Financial resource strain: Not on file     Food insecurity     Worry: Not on file     Inability: Not on file     Transportation needs     Medical: Not on file     Non-medical: Not on file   Tobacco Use     Smoking status: Never Smoker     Smokeless tobacco: Never Used   Substance and Sexual Activity     Alcohol use: Yes     Alcohol/week: 1.0 standard drinks     Types: 1 Cans of beer per week     Frequency: Monthly or less     Drinks per session: 1 or 2     Binge frequency: Never     Drug use: No     Sexual activity: Yes     Partners: Female   Lifestyle     Physical activity     Days per week: Not on file     Minutes per session: Not on file     Stress: Not on file   Relationships     Social connections     Talks on phone: Not on file     Gets together: Not on file     Attends Sabianism service: Not on file     Active member of club or organization: Not on file     Attends meetings of clubs or organizations: Not on file     Relationship status: Not on file     Intimate partner violence     Fear of current or ex partner: Not on file     Emotionally abused: Not on file     Physically abused: Not on file     Forced sexual activity: Not on file   Other Topics Concern     Not on file   Social History Narrative     Not on file       FAMILY HISTORY:  Family History   Problem Relation Age of Onset     Prostate Cancer Father         diagnosed later but fairly agressive     Abdominal Aortic Aneurysm Maternal Grandfather      Abdominal Aortic Aneurysm Other         Great uncles     Abdominal Aortic Aneurysm Maternal Uncle      Prostate Cancer Paternal Uncle         diagnosed later but fairly agressive     Prostate Cancer Paternal Great-Grandmother         diagnosed later but fairly agressive       Past/family/social  "history reviewed and no changes    PHYSICAL EXAMINATION:  Constitutional: aaox3, cooperative, pleasant, not dyspneic/diaphoretic, no acute distress  Vitals reviewed: Ht 1.753 m (5' 9\")   Wt 62 kg (136 lb 11 oz)   BMI 20.18 kg/m    Wt:   Wt Readings from Last 2 Encounters:   05/06/21 62 kg (136 lb 11 oz)   03/23/21 63.1 kg (139 lb 1.8 oz)      Eyes: Sclera anicteric/injected  Respiratory: Unlabored breathing  Skin: no jaundice  Psych: Normal affect            "

## 2021-05-06 NOTE — PROGRESS NOTES
"Nato Vaz is a 41 year old male who is being evaluated via a billable video visit.      Please send invite link to cell phone:  [phone]    The patient has been notified of following:     \"This video visit will be conducted via a call between you and your physician/provider. We have found that certain health care needs can be provided without the need for an in-person physical exam.  This service lets us provide the care you need with a video conversation.  If a prescription is necessary we can send it directly to your pharmacy.  If lab work is needed we can place an order for that and you can then stop by our lab to have the test done at a later time.    If during the course of the call the physician/provider feels a video visit is not appropriate, you will not be charged for this service.\"     Patient confirmed that they are in [place] for today's visit yes.    Video-Visit Details  Type of service:  Video Visit    Video Start Time: 2:25 PM  Video End Time:  2:55 PM    Originating Location (pt. Location): Home    Distant Location (provider location):  [facility] [place]     Platform used: Marcy            "

## 2021-05-06 NOTE — LETTER
"    5/6/2021         RE: Nato Vaz  [address]  [address] [place] [id number]        Dear Colleague,    Thank you for referring your patient, Nato Vaz, to the [facility] [place]. Please see a copy of my visit note below.    Nato Vaz is a 41 year old male who is being evaluated via a billable video visit.      Please send invite link to cell phone:  [phone]    The patient has been notified of following:     \"This video visit will be conducted via a call between you and your physician/provider. We have found that certain health care needs can be provided without the need for an in-person physical exam.  This service lets us provide the care you need with a video conversation.  If a prescription is necessary we can send it directly to your pharmacy.  If lab work is needed we can place an order for that and you can then stop by our lab to have the test done at a later time.    If during the course of the call the physician/provider feels a video visit is not appropriate, you will not be charged for this service.\"     Patient confirmed that they are in [place] for today's visit yes.    Video-Visit Details  Type of service:  Video Visit    Video Start Time: 2:25 PM  Video End Time:  2:55 PM    Originating Location (pt. Location): Home    Distant Location (provider location):  [facility] [place]     Platform used: [place]              GI CLINIC VISIT    CC/REFERRING MD:  No ref. provider found  REASON FOR CONSULTATION:   No ref. provider found for   Chief Complaint   Patient presents with     Follow Up       ASSESSMENT/PLAN:  41-year-old gentleman here today to follow-up for atypical chest discomfort (aching), belching, and rare dysphagia.    His upper endoscopy showed a completely normal esophagus.  Biopsies showed no evidence of reflux or eosinophilic esophagitis.  There was no stricturing or any other " abnormalities.    The upper endoscopy did show H. pylori gastritis and while he was on quadruple therapy for this his symptoms did greatly improve.  This does lead me to believe that his symptoms were driven by his gastritis.  We need to make sure that the H. pylori has been eradicated.  Therefore, he will provide us a stool sample for H. pylori stool antigen.  If this is still positive then we will need to treat him with another course of therapy.  If this is negative then his symptoms may be driven mainly by nonulcerative dyspepsia.  If that is the case, he will likely derive benefit from taking a longer course of PPI therapy.  After he submits his stool sample he can start taking omeprazole 40 mg p.o. twice daily.  I would recommend a 2-month course.    He is currently planning on moving to [place].  We will therefore follow-up with him remotely.  I will be in touch with him after he submits a stool sample next week.  He will give us an update on how his symptoms change with a more prolonged course of PPI therapy.    Regarding his question of dysphagia: as outlined above I think it is possible that this could actually be gas discomfort that is related to nonulcer dyspepsia.  We will follow the symptoms on PPI.  His esophagus showed no evidence of inflammation, stricture, or other pathology.  Biopsies showed no evidence of reflux or eosinophilic esophagitis.  If his dysphagia persists despite the above interventions and we can consider pH impedance testing and esophageal manometry testing.    All questions were answered to the patient's stated satisfaction.  He agrees with the plan as outlined.    RTC PRN    Thank you for this consultation.  It was a pleasure to participate in the care of this patient; please contact us with any further questions.      This note was created with voice recognition software, and while reviewed for accuracy, typos may remain.     Luis Miguel Leach MD  Inflammatory Bowel Disease  Program  Division of Gastroenterology, Hepatology and Nutrition  [facility]  Pager: [phone]      HPI:    41-year-old gentleman here today to follow-up in clinic for atypical chest discomfort, belching, and rare dysphagia.  I direct you to my initial consult note dated 2/11/2021.    After our visit we elected to proceed with an upper endoscopy.  His esophagus was normal and proximal and distal biopsies were unremarkable.  He did have some erythema in his stomach and biopsies showed H. pylori gastritis.  Duodenal biopsies were negative.    He was treated with bismuth quadruple therapy x14 days.  He took all pills and did not miss any.    He notes that while he was on the twice daily PPI as well as the additional medication he did feel his symptoms were greatly improved.  There was some GI upset related to the antibiotics but overall he was doing better.    Since stopping his medications he feels that his symptoms have returned.  There is chest aching as well as a significant belching.  He does still have some trouble swallowing.  However, now that he has paid more attention to it he thinks that this is gas discomfort in his epigastric region and when he burps this relieves the discomfort.  He no longer feels that he is having true dysphagia.    He is concerned that he could have ongoing H. pylori infection.  He has not yet submitted his follow-up H. pylori stool antigen.  He has not been taking PPI therapy for the last 4 weeks.    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  He will miss being here in [place] but he is excited about his new opportunity.    ROS:    No fevers or chills  No weight loss  No blurry vision, double vision or change in vision  No sore throat  No lymphadenopathy  No headache, paraesthesias, or weakness in a limb  No shortness of breath or wheezing  No chest pain or pressure  No arthralgias or myalgias  No rashes  or skin changes  No odynophagia or dysphagia  No BRBPR, hematochezia, melena  No dysuria, frequency or urgency  No hot/cold intolerance or polyria  No anxiety or depression    PREVIOUS ENDOSCOPY:  EGD completed 3/23/2021  -Normal esophagus biopsied.  Proximal and distal biopsies normal.  Z-line regular at 42 cm from the incisors.  -Erythematous mucosa in the stomach.  Biopsies showed H. pylori  Normal duodenum with normal biopsies    PERTINENT RELEVANT IMAGING OR LABS:  Reviewed    ALLERGIES:   No Known Allergies    PERTINENT MEDICATIONS:    Current Outpatient Medications:      gabapentin (NEURONTIN) 300 MG capsule, Take 2 capsules (600 mg) by mouth 2 times daily, Disp: 360 capsule, Rfl: 3     terbinafine (LAMISIL) 250 MG tablet, Take 1 tablet (250 mg) by mouth daily, Disp: 90 tablet, Rfl: 0    PROBLEM LIST  Patient Active Problem List    Diagnosis Date Noted     Migraine without aura and with status migrainosus, not intractable 12/17/2018     Priority: Medium       PERTINENT PAST MEDICAL HISTORY:  Past Medical History:   Diagnosis Date     History of migraine with aura 1985       PREVIOUS SURGERIES:  Past Surgical History:   Procedure Laterality Date     ESOPHAGOSCOPY, GASTROSCOPY, DUODENOSCOPY (EGD), COMBINED N/A 3/23/2021    Procedure: ESOPHAGOGASTRODUODENOSCOPY (EGD);  Surgeon: Luis Miguel Leach MD;  Location: [facility]     NO HISTORY OF SURGERY         SOCIAL HISTORY:  Social History     Socioeconomic History     Marital status:      Spouse name: Not on file     Number of children: Not on file     Years of education: Not on file     Highest education level: Not on file   Occupational History     Occupation: Neurologist   Social Needs     Financial resource strain: Not on file     Food insecurity     Worry: Not on file     Inability: Not on file     Transportation needs     Medical: Not on file     Non-medical: Not on file   Tobacco Use     Smoking status: Never Smoker     Smokeless tobacco: Never  "Used   Substance and Sexual Activity     Alcohol use: Yes     Alcohol/week: 1.0 standard drinks     Types: 1 Cans of beer per week     Frequency: Monthly or less     Drinks per session: 1 or 2     Binge frequency: Never     Drug use: No     Sexual activity: Yes     Partners: Female   Lifestyle     Physical activity     Days per week: Not on file     Minutes per session: Not on file     Stress: Not on file   Relationships     Social connections     Talks on phone: Not on file     Gets together: Not on file     Attends Mosque service: Not on file     Active member of club or organization: Not on file     Attends meetings of clubs or organizations: Not on file     Relationship status: Not on file     Intimate partner violence     Fear of current or ex partner: Not on file     Emotionally abused: Not on file     Physically abused: Not on file     Forced sexual activity: Not on file   Other Topics Concern     Not on file   Social History Narrative     Not on file       FAMILY HISTORY:  Family History   Problem Relation Age of Onset     Prostate Cancer Father         diagnosed later but fairly agressive     Abdominal Aortic Aneurysm Maternal Grandfather      Abdominal Aortic Aneurysm Other         Great uncles     Abdominal Aortic Aneurysm Maternal Uncle      Prostate Cancer Paternal Uncle         diagnosed later but fairly agressive     Prostate Cancer Paternal Great-Grandmother         diagnosed later but fairly agressive       Past/family/social history reviewed and no changes    PHYSICAL EXAMINATION:  Constitutional: aaox3, cooperative, pleasant, not dyspneic/diaphoretic, no acute distress  Vitals reviewed: Ht 1.753 m (5' 9\")   Wt 62 kg (136 lb 11 oz)   BMI 20.18 kg/m    Wt:   Wt Readings from Last 2 Encounters:   05/06/21 62 kg (136 lb 11 oz)   03/23/21 63.1 kg (139 lb 1.8 oz)      Eyes: Sclera anicteric/injected  Respiratory: Unlabored breathing  Skin: no jaundice  Psych: Normal affect      Again, thank you for " allowing me to participate in the care of your patient.      Sincerely,    Luis Miguel Leach MD

## 2021-05-06 NOTE — PATIENT INSTRUCTIONS
It was very nice to meet with you again during your virtual visit today.  I outlined the recommendations below.    Please submit your H. pylori stool antigen sample.  This will let us know if the H. pylori has been cleared or not.    If the H. pylori is still present we will talk about another round of antibiotics to treat it.    If the H. pylori is not present we will give you a longer course of proton pump inhibitor therapy.    After you submit your stool sample you can start taking omeprazole 40 mg twice daily.  We will likely give you a 2-month course to see how your symptoms go.    If you still have trouble swallowing in the future that has not improved with taking the acid suppression therapy then we can discuss having you get a pH impedance test as well as an esophageal manometry test.    Good luck with your move to [place]!  Congratulations on your new position.    Please call or MyChart with questions.

## 2021-05-06 NOTE — NURSING NOTE
"Chief Complaint   Patient presents with     Follow Up       Vitals:    05/06/21 1404   Weight: 62 kg (136 lb 11 oz)   Height: 1.753 m (5' 9\")       Body mass index is 20.18 kg/m .    Maggie Sims CMA    "

## 2021-05-12 DIAGNOSIS — K29.70 HELICOBACTER PYLORI GASTRITIS: ICD-10-CM

## 2021-05-12 DIAGNOSIS — B96.81 HELICOBACTER PYLORI GASTRITIS: ICD-10-CM

## 2021-05-12 PROCEDURE — 87338 HPYLORI STOOL AG IA: CPT | Mod: 90 | Performed by: PATHOLOGY

## 2021-05-12 PROCEDURE — 99000 SPECIMEN HANDLING OFFICE-LAB: CPT | Performed by: PATHOLOGY

## 2021-05-13 LAB — H PYLORI AG STL QL IA: NEGATIVE

## 2021-06-02 ENCOUNTER — OFFICE VISIT (OUTPATIENT)
Dept: FAMILY MEDICINE | Facility: CLINIC | Age: 41
End: 2021-06-02
Payer: COMMERCIAL

## 2021-06-02 VITALS
HEART RATE: 55 BPM | SYSTOLIC BLOOD PRESSURE: 124 MMHG | DIASTOLIC BLOOD PRESSURE: 81 MMHG | OXYGEN SATURATION: 98 % | TEMPERATURE: 97.8 F | BODY MASS INDEX: 20.5 KG/M2 | WEIGHT: 138.8 LBS

## 2021-06-02 DIAGNOSIS — Z12.5 SCREENING FOR PROSTATE CANCER: ICD-10-CM

## 2021-06-02 DIAGNOSIS — Z00.00 ANNUAL PHYSICAL EXAM: Primary | ICD-10-CM

## 2021-06-02 DIAGNOSIS — Z13.6 SCREENING FOR HEART DISEASE: ICD-10-CM

## 2021-06-02 DIAGNOSIS — Z13.1 SCREENING FOR DIABETES MELLITUS: ICD-10-CM

## 2021-06-02 LAB
CHOLEST SERPL-MCNC: 144 MG/DL
GLUCOSE SERPL-MCNC: 82 MG/DL (ref 70–99)
HDLC SERPL-MCNC: 63 MG/DL
LDLC SERPL CALC-MCNC: 68 MG/DL
NONHDLC SERPL-MCNC: 81 MG/DL
PSA SERPL-ACNC: 1.77 UG/L (ref 0–4)
TRIGL SERPL-MCNC: 68 MG/DL

## 2021-06-02 PROCEDURE — 99396 PREV VISIT EST AGE 40-64: CPT | Performed by: NURSE PRACTITIONER

## 2021-06-02 PROCEDURE — G0103 PSA SCREENING: HCPCS | Performed by: PATHOLOGY

## 2021-06-02 PROCEDURE — 82947 ASSAY GLUCOSE BLOOD QUANT: CPT | Performed by: PATHOLOGY

## 2021-06-02 PROCEDURE — 80061 LIPID PANEL: CPT | Performed by: PATHOLOGY

## 2021-06-02 PROCEDURE — 36415 COLL VENOUS BLD VENIPUNCTURE: CPT | Performed by: PATHOLOGY

## 2021-06-02 ASSESSMENT — ANXIETY QUESTIONNAIRES
1. FEELING NERVOUS, ANXIOUS, OR ON EDGE: SEVERAL DAYS
5. BEING SO RESTLESS THAT IT IS HARD TO SIT STILL: SEVERAL DAYS
6. BECOMING EASILY ANNOYED OR IRRITABLE: SEVERAL DAYS
7. FEELING AFRAID AS IF SOMETHING AWFUL MIGHT HAPPEN: SEVERAL DAYS
3. WORRYING TOO MUCH ABOUT DIFFERENT THINGS: SEVERAL DAYS
GAD7 TOTAL SCORE: 7
IF YOU CHECKED OFF ANY PROBLEMS ON THIS QUESTIONNAIRE, HOW DIFFICULT HAVE THESE PROBLEMS MADE IT FOR YOU TO DO YOUR WORK, TAKE CARE OF THINGS AT HOME, OR GET ALONG WITH OTHER PEOPLE: NOT DIFFICULT AT ALL
2. NOT BEING ABLE TO STOP OR CONTROL WORRYING: SEVERAL DAYS

## 2021-06-02 ASSESSMENT — PATIENT HEALTH QUESTIONNAIRE - PHQ9
5. POOR APPETITE OR OVEREATING: SEVERAL DAYS
SUM OF ALL RESPONSES TO PHQ QUESTIONS 1-9: 0

## 2021-06-02 ASSESSMENT — PAIN SCALES - GENERAL: PAINLEVEL: NO PAIN (0)

## 2021-06-02 NOTE — PATIENT INSTRUCTIONS
[facility] Medication Refill Request Information:  * Please contact your pharmacy regarding ANY request for medication refills.  ** [facility] Prescription Fax = [phone]  * Please allow 3 business days for routine medication refills.  * Please allow 5 business days for controlled substance medication refills.     [facility] Test Result notification information:  *You will be notified with in 7-10 days of your appointment day regarding the results of your test.  If you are on MyChart you will be notified as soon as the provider has reviewed the results and signed off on them.    [facility]: [phone]     If you have questions regarding Covid-19 and the Covid-19 vaccine, please visit this website.    [email or web address]

## 2021-06-02 NOTE — PROGRESS NOTES
Male Physical Note          HPI         Concerns today: Migraine Aura started during exam, ibuprofen 600 dye free given during his appt.       Patient Active Problem List   Diagnosis     Migraine without aura and with status migrainosus, not intractable       Past Medical History:   Diagnosis Date     History of migraine with aura 1985       Previous Medical Care-Out of State       Family History   Problem Relation Age of Onset     Prostate Cancer Father         diagnosed later but fairly agressive     Abdominal Aortic Aneurysm Maternal Grandfather      Abdominal Aortic Aneurysm Other         Great uncles     Abdominal Aortic Aneurysm Maternal Uncle      Prostate Cancer Paternal Uncle         diagnosed later but fairly agressive     Prostate Cancer Paternal Great-Grandmother         diagnosed later but fairly agressive           Review of Systems:     Review of Systems:  CONSTITUTIONAL: NEGATIVE for fever, chills, change in weight  INTEGUMENTARY/SKIN: NEGATIVE for worrisome rashes, moles or lesions; rash on eyelids  EYES: NEGATIVE for vision changes or irritation  ENT/MOUTH: NEGATIVE for ear, mouth and throat problems  RESP: NEGATIVE for significant cough or SOB  BREAST: NEGATIVE for masses, tenderness or discharge  CV: NEGATIVE for chest pain, palpitations or peripheral edema  GI: NEGATIVE for nausea, abdominal pain, heartburn, or change in bowel habits-treatment for h pylori did not help, had egd with normal biopsies  : NEGATIVE for frequency, dysuria, or hematuria  MUSCULOSKELETAL: NEGATIVE for significant arthralgias or myalgia  NEURO: NEGATIVE for weakness, dizziness or paresthesias  ENDOCRINE: NEGATIVE for temperature intolerance, skin/hair changes  HEME/ALLERGY: NEGATIVE for bleeding problems  PSYCHIATRIC: NEGATIVE for changes in mood or affect  Sleep:   Do you snore most or the night (as reported by a family member)? No  Do you feel sleepy or extremely tired during most of the day? No         Social  History     Social History     Socioeconomic History     Marital status:      Spouse name: Not on file     Number of children: Not on file     Years of education: Not on file     Highest education level: Not on file   Occupational History     Occupation: Neurologist   Social Needs     Financial resource strain: Not on file     Food insecurity     Worry: Not on file     Inability: Not on file     Transportation needs     Medical: Not on file     Non-medical: Not on file   Tobacco Use     Smoking status: Never Smoker     Smokeless tobacco: Never Used   Substance and Sexual Activity     Alcohol use: Yes     Alcohol/week: 1.0 standard drinks     Types: 1 Cans of beer per week     Frequency: Monthly or less     Drinks per session: 1 or 2     Binge frequency: Never     Drug use: No     Sexual activity: Yes     Partners: Female   Lifestyle     Physical activity     Days per week: Not on file     Minutes per session: Not on file     Stress: Not on file   Relationships     Social connections     Talks on phone: Not on file     Gets together: Not on file     Attends [employer] service: Not on file     Active member of club or organization: Not on file     Attends meetings of clubs or organizations: Not on file     Relationship status: Not on file     Intimate partner violence     Fear of current or ex partner: Not on file     Emotionally abused: Not on file     Physically abused: Not on file     Forced sexual activity: Not on file   Other Topics Concern     Not on file   Social History Narrative     Not on file   Marital Status:  Who lives in your household? Lives alone now-wife will move in with him when he moves to [place] at the end of this month  Has anyone hurt you physically, for example by pushing, hitting, slapping or kicking you or forcing you to have sex? Denies  Do you feel threatened or controlled by a partner, ex-partner or anyone in your life? Denies    Sexual Health     Sexual concerns: No    STI History: Neg  Recommended Screening   Lipid,glucose, PSA.        Physical Exam:     Vitals: /81   Pulse 55   Temp 97.8  F (36.6  C) (Oral)   Wt 63 kg (138 lb 12.8 oz)   SpO2 98%   BMI 20.50 kg/m    BMI= Body mass index is 20.5 kg/m .  GENERAL: healthy, alert and no distress  EYES: Eyes grossly normal to inspection, extraocular movements - intact, and PERRL  HENT: ear canals- normal; TMs- normal; Nose- normal; Mouth- no ulcers, no lesions  NECK: no tenderness, no adenopathy, no asymmetry, no masses, no stiffness; thyroid- normal to palpation  RESP: lungs clear to auscultation - no rales, no rhonchi, no wheezes  BREAST: no masses, no tenderness, no nipple discharge, no palpable axillary masses or adenopathy  CV: regular rates and rhythm, normal S1 S2, no S3 or S4 and no murmur, no click or rub -  ABDOMEN: soft, no tenderness, no  hepatosplenomegaly, no masses, normal bowel sounds  MS: extremities- no gross deformities noted, no edema  SKIN: no suspicious lesions, no rashes  NEURO: strength and tone- normal, sensory exam- grossly normal, mentation- intact, speech- normal, reflexes- symmetric  BACK: no CVA tenderness, no paralumbar tenderness  PSYCH: Alert and oriented times 3; speech- coherent , normal rate and volume; able to articulate logical thoughts, able to abstract reason, no tangential thoughts, no hallucinations or delusions, affect- normal  LYMPHATICS: ant. cervical- normal, post. cervical- normal, axillary- normal, supraclavicular- normal, inguinal- normal    Assessment and Plan      Nato was seen today for physical.    Diagnoses and all orders for this visit:    Annual physical exam    Screening for heart disease  -     Lipid Profile FASTING; Future    Screening for diabetes mellitus  -     Glucose; Future    Screening for prostate cancer  -     PSA screen; Future    PLAN:  1.Lipid panel and Blood glucose ordered and PSA     Nato will review labs on his chart and send any questions as  needed. Options for treatment and follow-up care were reviewed with the patient. Nato Vaz and/or guardian engaged in the decision making process and verbalized understanding of the options discussed and agreed with the final plan.  CONNIE Vaughn, CNP

## 2021-06-02 NOTE — NURSING NOTE
41 year old  Chief Complaint   Patient presents with     Physical     Pt is here for a physical.       Blood pressure 124/81, pulse 55, temperature 97.8  F (36.6  C), temperature source Oral, weight 63 kg (138 lb 12.8 oz), SpO2 98 %. Body mass index is 20.5 kg/m .  BP completed using cuff size:      Nichole Argueta, ALIREZA  June 2, 2021 10:17 AM

## 2021-06-03 ASSESSMENT — ANXIETY QUESTIONNAIRES: GAD7 TOTAL SCORE: 7

## 2021-10-10 ENCOUNTER — HEALTH MAINTENANCE LETTER (OUTPATIENT)
Age: 41
End: 2021-10-10

## 2022-07-16 ENCOUNTER — HEALTH MAINTENANCE LETTER (OUTPATIENT)
Age: 42
End: 2022-07-16

## 2022-09-18 ENCOUNTER — HEALTH MAINTENANCE LETTER (OUTPATIENT)
Age: 42
End: 2022-09-18

## 2023-07-30 ENCOUNTER — HEALTH MAINTENANCE LETTER (OUTPATIENT)
Age: 43
End: 2023-07-30

## (undated) RX ORDER — FENTANYL CITRATE 50 UG/ML
INJECTION, SOLUTION INTRAMUSCULAR; INTRAVENOUS
Status: DISPENSED
Start: 2021-03-23